# Patient Record
Sex: MALE | Race: BLACK OR AFRICAN AMERICAN
[De-identification: names, ages, dates, MRNs, and addresses within clinical notes are randomized per-mention and may not be internally consistent; named-entity substitution may affect disease eponyms.]

---

## 2017-09-24 NOTE — RAD
PA and lateral chest radiographs 9/24/2017.



Clinical History: Cough and sore throat for 4 days. 



PA and lateral digital radiographs of the chest were obtained. Comparison

study is dated 1/4/2016. The cardiac and mediastinal silhouettes are within

normal limits in size and configuration. No pulmonary infiltrate is seen. No

pleural effusion or pneumothorax is noted. Minimal S shaped curvature of the

thoracolumbar spine is seen.



Impression: No acute pulmonary infiltrate is seen.

## 2017-09-24 NOTE — PHYS DOC
Past Medical History


Past Medical History:  No Pertinent History, Hypertension, Schizophrenia


Additional Past Medical Histor:  "my heart rate is diffrent than everyone else"


Past Surgical History:  No Surgical History


Alcohol Use:  None


Drug Use:  None





Adult General


Chief Complaint


Chief Complaint:  SORE THROAT





HPI


HPI





Patient is a 26  year old male who presents with complaint of cough and sore 

throat. History was collected by patient's girlfriend as patient is currently 

refusing to speak due to his discomfort. It is reported the patient's had cough 

for the past 4 days. Patient awoke with sore throat this morning. Patient has 

been coughing up thick greenish phlegm and has been having streaks of blood in 

his mucus when he coughs. No reported fevers. Denies any significant health 

problems. Patient has not taken any medications to help with symptoms. No 

recent travel and no known sick contacts.





Review of Systems


Review of Systems





Constitutional: Denies fever or chills []


Eyes: Denies change in visual acuity, redness, or eye pain []


HENT: Sore throat[]


Respiratory: Cough[]


Cardiovascular: Denies chest pain[]


GI: Denies abdominal pain, nausea, vomiting, bloody stools or diarrhea []


: Denies dysuria or hematuria []


Musculoskeletal: Denies back pain or joint pain []


Integument: Denies rash or skin lesions []


Neurologic: Denies headache, focal weakness or sensory changes []





Current Medications


Current Medications





Current Medications








 Medications


  (Trade)  Dose


 Ordered  Sig/Ministerio  Start Time


 Stop Time Status Last Admin


Dose Admin


 


 Dexamethasone


 Sodium Phosphate


  (Decadron)  16 mg  1X  ONCE  17 10:45


 17 10:50 DC 17 10:52


16 MG











Allergies


Allergies





Allergies








Coded Allergies Type Severity Reaction Last Updated Verified


 


  No Known Drug Allergies    12/5/15 No











Physical Exam


Physical Exam





Constitutional: Alert, afebrile, refuses to speak, follows commands. []


HENT: Normocephalic, atraumatic, bilateral external ears normal, oropharynx 

erythematous, friable pharyngeal mucosa, no oral exudates, nose normal. []


Eyes: PERRLA, EOMI, conjunctiva normal, no discharge. [] 


Neck: Normal range of motion, anterior cervical lymphadenopathy present, supple

, no stridor. [] 


Cardiovascular:Heart rate regular rhythm, no murmur []


Lungs & Thorax:  Bilateral breath sounds clear to auscultation []


Abdomen: Bowel sounds normal, soft, no tenderness, no masses, no pulsatile 

masses. [] 


Skin: Warm, dry, no erythema, no rash. [] 


Back: No tenderness, no CVA tenderness. [] 


Extremities: No tenderness, no cyanosis, no clubbing, ROM intact, no edema. [] 


Neurologic: Alert, follows commands but refuses to speak, normal motor function

, normal sensory function, no focal deficits noted. []





Current Patient Data


Vital Signs





 Vital Signs








  Date Time  Temp Pulse Resp B/P (MAP) Pulse Ox O2 Delivery O2 Flow Rate FiO2


 


17 10:58 98.6 121 18  98 Room Air  





 98.6       








Lab Values





 Laboratory Tests








Test


  17


10:46


 


Group A Streptococcus Rapid


  Negative


(NEGATIVE)











EKG


EKG


Not performed[]





Radiology/Procedures


Radiology/Procedures


 [address] [address]


 [phone]


 


 IMAGING REPORT





 Signed





PATIENT: CHE COLINDRES ACCOUNT: [id number] MRN#: [id number]


: 1990 LOCATION: ER AGE: 26


SEX: M EXAM DT: 17 ACCESSION#: [id number]


STATUS: REG ER ORD. PHYSICIAN: BRITTNEY LAUGHLIN MD 


REASON: cough for 4 days


PROCEDURE: NECK SOFT TISSUE





AP and lateral soft tissue neck radiographs 2017





Clinical history: Cough and sore throat for 4 days.





AP and lateral digital radiographs of the neck were obtained using soft tissue


techniques. The epiglottis and aryepiglottic folds are within normal limits.


No prevertebral soft tissue swelling is seen. The osseous structures are


grossly intact.





Impression: Negative study.














DICTATED and SIGNED BY:     NICKOLAS WEBB MD


DATE:     17





CC: BRITTNEY LAUGHLIN MD; NO PCP ~


 [place] [id number] [phone]


 


 IMAGING REPORT





 Signed





PATIENT: CHE COLINDRES ACCOUNT: [id number] MRN#: [id number]


: 1990 LOCATION: ER AGE: 26


SEX: M EXAM DT: 17 ACCESSION#: [id number]


STATUS: REG ER ORD. PHYSICIAN: BRITTNEY LAUGHLIN MD 


REASON: cough for 4 days


PROCEDURE: CHEST PA & LATERAL








PA and lateral chest radiographs 2017.





Clinical History: Cough and sore throat for 4 days. 





PA and lateral digital radiographs of the chest were obtained. Comparison


study is dated 2016. The cardiac and mediastinal silhouettes are within


normal limits in size and configuration. No pulmonary infiltrate is seen. No


pleural effusion or pneumothorax is noted. Minimal S shaped curvature of the


thoracolumbar spine is seen.





Impression: No acute pulmonary infiltrate is seen.














DICTATED and SIGNED BY:     NICKOLAS WEBB MD


DATE:     17 1125





CC: BRITTNEY LAUGHLIN MD; NO PCP ~


[]





Course & Med Decision Making


Course & Med Decision Making


Pertinent Labs and Imaging studies reviewed. (See chart for details)





Rapid strep test was negative. X-rays negative. Patient's symptoms appear 

secondary to viral illness. Patient was treated with Decadron in the emergency 

department. Patient reports improvement in symptoms after treatment. Patient's 

symptoms likely consistent with viral upper respiratory infection. Patient will 

continue on Tylenol and Medrol Dosepak for outpatient treatment. Recommended 

follow-up with primary doctor in 2-3 days for reevaluation. Advised return 

emergency department for any worsening symptoms. Patient voiced understanding 

and in agreement with treatment plan.





Dragon Disclaimer


Dragon Disclaimer


This electronic medical record was generated, in whole or in part, using a 

voice recognition dictation system.





Departure


Departure


Impression:  


 Primary Impression:  


 Viral pharyngitis


 Additional Impression:  


 Upper respiratory infection


Disposition:  01 HOME, SELF-CARE


Condition:  IMPROVED


Referrals:  


NO PCP (PCP)


Patient Instructions:  Upper Respiratory Infection, Adult, Viral Pharyngitis





Additional Instructions:  


Follow-up with your primary doctor in 2-3 days for reevaluation. Return to 

emergency department for any worsening symptoms.


Scripts


Methylprednisolone (MEDROL) 4 Mg Tab.ds.pk


1 PKG PO UD, #1 PKG


   Prov: BRITTNEY LAUGHLIN MD         17





Problem Qualifiers








 Additional Impression:  


 Upper respiratory infection


 URI type:  unspecified URI  Qualified Codes:  J06.9 - Acute upper respiratory 

infection, unspecified








BRITTNEY LAUGHLIN MD Sep 24, 2017 10:48

## 2017-09-24 NOTE — RAD
AP and lateral soft tissue neck radiographs 9/24/2017



Clinical history: Cough and sore throat for 4 days.



AP and lateral digital radiographs of the neck were obtained using soft tissue

techniques. The epiglottis and aryepiglottic folds are within normal limits.

No prevertebral soft tissue swelling is seen. The osseous structures are

grossly intact.



Impression: Negative study.

## 2017-10-18 ENCOUNTER — HOSPITAL ENCOUNTER (EMERGENCY)
Dept: HOSPITAL 61 - ER | Age: 27
Discharge: HOME | End: 2017-10-18
Payer: COMMERCIAL

## 2017-10-18 VITALS — WEIGHT: 165 LBS | BODY MASS INDEX: 27.49 KG/M2 | HEIGHT: 65 IN

## 2017-10-18 VITALS — DIASTOLIC BLOOD PRESSURE: 83 MMHG | SYSTOLIC BLOOD PRESSURE: 129 MMHG

## 2017-10-18 DIAGNOSIS — K92.0: ICD-10-CM

## 2017-10-18 DIAGNOSIS — R10.84: Primary | ICD-10-CM

## 2017-10-18 DIAGNOSIS — F20.9: ICD-10-CM

## 2017-10-18 DIAGNOSIS — I10: ICD-10-CM

## 2017-10-18 LAB
ALBUMIN SERPL-MCNC: 3.8 G/DL (ref 3.4–5)
ALBUMIN/GLOB SERPL: 1 {RATIO} (ref 1–1.7)
ALP SERPL-CCNC: 69 U/L (ref 46–116)
ALT SERPL-CCNC: 41 U/L (ref 16–63)
ANION GAP SERPL CALC-SCNC: 7 MMOL/L (ref 6–14)
AST SERPL-CCNC: 24 U/L (ref 15–37)
BACTERIA #/AREA URNS HPF: 0 /HPF
BARBITURATES UR-MCNC: (no result) UG/ML
BASOPHILS # BLD AUTO: 0 X10^3/UL (ref 0–0.2)
BASOPHILS NFR BLD: 0 % (ref 0–3)
BENZODIAZ UR-MCNC: (no result) UG/L
BILIRUB SERPL-MCNC: 1 MG/DL (ref 0.2–1)
BILIRUB UR QL STRIP: NEGATIVE
BUN SERPL-MCNC: 14 MG/DL (ref 8–26)
BUN/CREAT SERPL: 11 (ref 6–20)
CALCIUM SERPL-MCNC: 8.8 MG/DL (ref 8.5–10.1)
CANNABINOIDS UR-MCNC: (no result) UG/L
CHLORIDE SERPL-SCNC: 103 MMOL/L (ref 98–107)
CO2 SERPL-SCNC: 32 MMOL/L (ref 21–32)
COCAINE UR-MCNC: (no result) NG/ML
CREAT SERPL-MCNC: 1.3 MG/DL (ref 0.7–1.3)
EOSINOPHIL NFR BLD: 1 % (ref 0–3)
ERYTHROCYTE [DISTWIDTH] IN BLOOD BY AUTOMATED COUNT: 13.3 % (ref 11.5–14.5)
GFR SERPLBLD BASED ON 1.73 SQ M-ARVRAT: 80.7 ML/MIN
GLOBULIN SER-MCNC: 4 G/DL (ref 2.2–3.8)
GLUCOSE SERPL-MCNC: 105 MG/DL (ref 70–99)
GLUCOSE UR STRIP-MCNC: NEGATIVE MG/DL
HCT VFR BLD CALC: 47.3 % (ref 39–53)
HGB BLD-MCNC: 16.4 G/DL (ref 13–17.5)
LYMPHOCYTES # BLD: 0.2 X10^3/UL (ref 1–4.8)
LYMPHOCYTES NFR BLD AUTO: 3 % (ref 24–48)
MCH RBC QN AUTO: 33 PG (ref 25–35)
MCHC RBC AUTO-ENTMCNC: 35 G/DL (ref 31–37)
MCV RBC AUTO: 94 FL (ref 79–100)
METHADONE SERPL-MCNC: (no result) NG/ML
MONOCYTES NFR BLD: 6 % (ref 0–9)
NEUTROPHILS NFR BLD AUTO: 91 % (ref 31–73)
NITRITE UR QL STRIP: NEGATIVE
OPIATES UR-MCNC: (no result) NG/ML
PCP SERPL-MCNC: (no result) MG/DL
PH UR STRIP: 8.5 [PH]
PLATELET # BLD AUTO: 249 X10^3/UL (ref 140–400)
PLATELET # BLD EST: ADEQUATE 10*3/UL
POTASSIUM SERPL-SCNC: 4.3 MMOL/L (ref 3.5–5.1)
PROT SERPL-MCNC: 7.8 G/DL (ref 6.4–8.2)
PROT UR STRIP-MCNC: NEGATIVE MG/DL
RBC # BLD AUTO: 5.05 X10^6/UL (ref 4.3–5.7)
RBC #/AREA URNS HPF: 0 /HPF (ref 0–2)
SODIUM SERPL-SCNC: 142 MMOL/L (ref 136–145)
SP GR UR STRIP: 1.02
SQUAMOUS #/AREA URNS LPF: (no result) /LPF
UROBILINOGEN UR-MCNC: 0.2 MG/DL
WBC # BLD AUTO: 8.4 X10^3/UL (ref 4–11)
WBC #/AREA URNS HPF: (no result) /HPF (ref 0–4)

## 2017-10-18 PROCEDURE — 96372 THER/PROPH/DIAG INJ SC/IM: CPT

## 2017-10-18 PROCEDURE — 36415 COLL VENOUS BLD VENIPUNCTURE: CPT

## 2017-10-18 PROCEDURE — 87591 N.GONORRHOEAE DNA AMP PROB: CPT

## 2017-10-18 PROCEDURE — 83690 ASSAY OF LIPASE: CPT

## 2017-10-18 PROCEDURE — 87491 CHLMYD TRACH DNA AMP PROBE: CPT

## 2017-10-18 PROCEDURE — 80053 COMPREHEN METABOLIC PANEL: CPT

## 2017-10-18 PROCEDURE — 74176 CT ABD & PELVIS W/O CONTRAST: CPT

## 2017-10-18 PROCEDURE — 85007 BL SMEAR W/DIFF WBC COUNT: CPT

## 2017-10-18 PROCEDURE — 80307 DRUG TEST PRSMV CHEM ANLYZR: CPT

## 2017-10-18 PROCEDURE — 81001 URINALYSIS AUTO W/SCOPE: CPT

## 2017-10-18 PROCEDURE — G0479 DRUG TEST PRESUMP NOT OPT: HCPCS

## 2017-10-18 PROCEDURE — 85025 COMPLETE CBC W/AUTO DIFF WBC: CPT

## 2017-10-18 PROCEDURE — 99285 EMERGENCY DEPT VISIT HI MDM: CPT

## 2017-10-18 NOTE — RAD
CT of the abdomen and pelvis without contrast 10/18/2017



Indication: Left flank pain



Comparison study: None



Technique: Multidetector CT imaging of the abdomen and pelvis was performed

without the administration of contrast.



Discussion: Visualized lung bases demonstrate no acute abnormality. Evidence

of prior granulomatous disease seen in the right lung base. Calcified

granulomata are noted in the liver and spleen. The liver and spleen otherwise

demonstrate a unremarkable noncontrast enhanced appearance. Gallbladder,

adrenal glands, pancreas have an unremarkable noncontrast enhanced appearance.

Bilateral kidneys are grossly unremarkable in appearance without evidence of

hydronephrosis or nephrolithiasis. The ureters are grossly unremarkable in

course and contour. No free fluid or free air is seen in the abdomen or

pelvis. Limited visualization of the bowel demonstrates no obstruction or

other gross abnormality. There is no evidence of appendicitis. No evidence of

acute osseous abnormality is identified.



Impression: No evidence of acute intra-abdominal abnormality is identified. No

evidence of nephrolithiasis or acute obstructive uropathy is seen.

## 2017-10-18 NOTE — PHYS DOC
Past Medical History


Past Medical History:  No Pertinent History, Hypertension, Schizophrenia


Additional Past Medical Histor:  "my heart rate is diffrent than everyone else"


Past Surgical History:  No Surgical History


Alcohol Use:  None


Drug Use:  None





Adult General


Chief Complaint


Chief Complaint:  ABDOMINAL PAIN





HPI


HPI





Patient is a 26  year old male with history of schizophrenia and hypertension 

who presents today with mild generalized abdominal pain and hematemesis that 

began this morning at 12:00 am. Patient denies any fever. Denies any diarrhea. 

Patient is also complaining of left flank pain. Patient denies any urgency 

frequency or dysuria though patient informed the nurse he has had penile 

discharge and is concerned about STDs. Denies any hematuria.





Review of Systems


Review of Systems





Constitutional: Denies fever or chills []


Eyes: Denies change in visual acuity, redness, or eye pain []


HENT: Denies nasal congestion or sore throat []


Respiratory: Denies cough or shortness of breath []


Cardiovascular: No additional information not addressed in HPI []


GI: generalized abdominal pain, nausea, vomiting blood, denies bloody stools or 

diarrhea []


: Denies dysuria or hematuria []


Musculoskeletal: Denies back pain or joint pain []


Integument: Denies rash or skin lesions []


Neurologic: Denies headache, focal weakness or sensory changes []





Current Medications


Current Medications





Current Medications








 Medications


  (Trade)  Dose


 Ordered  Sig/Ministerio  Start Time


 Stop Time Status Last Admin


Dose Admin


 


 Azithromycin


  (Zithromax)  1,000 mg  1X  ONCE  10/18/17 10:45


 10/18/17 10:46   


 


 


 Ceftriaxone Sodium


  (Rocephin Im)  250 mg  1X  ONCE  10/18/17 10:45


 10/18/17 10:46   


 


 


 Famotidine


  (Pepcid)  20 mg  1X  ONCE  10/18/17 09:00


 10/18/17 09:01 DC 10/18/17 09:03


20 MG


 


 Ketorolac


 Tromethamine


  (Toradol)  30 mg  1X  ONCE  10/18/17 08:15


 10/18/17 08:16 Cancel  


 


 


 Metronidazole


  (Flagyl)  2,000 mg  1X  ONCE  10/18/17 10:45


 10/18/17 10:46   


 


 


 Multi-Ingredient


 Mouthwash/Gargle


  (Gi Cocktail


 Single Dose)  15 ml  1X  ONCE  10/18/17 09:00


 10/18/17 09:01 DC 10/18/17 09:03


15 ML


 


 Ondansetron HCl


  (Zofran Odt)  4 mg  1X  ONCE  10/18/17 09:00


 10/18/17 09:01 DC 10/18/17 09:02


4 MG


 


 Ondansetron HCl


  (Zofran)  4 mg  1X  ONCE  10/18/17 08:15


 10/18/17 08:16 Cancel  


 


 


 Sodium Chloride  1,000 ml @ 


 1,000 mls/hr  1X  ONCE  10/18/17 08:15


 10/18/17 09:14 DC  


 











Allergies


Allergies





Allergies








Coded Allergies Type Severity Reaction Last Updated Verified


 


  No Known Drug Allergies    12/5/15 No











Physical Exam


Physical Exam





Constitutional: Well developed, well nourished, no acute distress, non-toxic 

appearance. []


HENT: Normocephalic, atraumatic, bilateral external ears normal, oropharynx 

moist, no oral exudates, nose normal. []


Eyes: PERRLA, EOMI, conjunctiva normal, no discharge. [] 


Neck: Normal range of motion, no tenderness, supple, no stridor. [] 


Cardiovascular:Heart rate regular rhythm, no murmur []


Lungs & Thorax:  Bilateral breath sounds clear to auscultation []


Abdomen: Rounded abdomen. Bowel sounds normal, soft, no tenderness, no masses, 

no pulsatile masses. [] 


Skin: Warm, dry, no erythema, no rash. [] 


Back: No tenderness, no CVA tenderness. [] 


Extremities: No tenderness, no cyanosis, no clubbing, ROM intact, no edema. [] 


Neurologic: Alert and oriented X 3, normal motor function, normal sensory 

function, no focal deficits noted. []


Psychologic: Affect normal, judgement normal, mood normal. []





Current Patient Data


Vital Signs





 Vital Signs








  Date Time  Temp Pulse Resp B/P (MAP) Pulse Ox O2 Delivery O2 Flow Rate FiO2


 


10/18/17 09:51  84 22 125/80 (95) 96 Room Air  


 


10/18/17 08:00 98.1       





 98.1       








Lab Values





 Laboratory Tests








Test


  10/18/17


08:10 10/18/17


09:40


 


White Blood Count


  8.4 x10^3/uL


(4.0-11.0) 


 


 


Red Blood Count


  5.05 x10^6/uL


(4.30-5.70) 


 


 


Hemoglobin


  16.4 g/dL


(13.0-17.5) 


 


 


Hematocrit


  47.3 %


(39.0-53.0) 


 


 


Mean Corpuscular Volume


  94 fL ()


  


 


 


Mean Corpuscular Hemoglobin 33 pg (25-35)   


 


Mean Corpuscular Hemoglobin


Concent 35 g/dL


(31-37) 


 


 


Red Cell Distribution Width


  13.3 %


(11.5-14.5) 


 


 


Platelet Count


  249 x10^3/uL


(140-400) 


 


 


Neutrophils (%) (Auto) 91 % (31-73)  H 


 


Lymphocytes (%) (Auto) 3 % (24-48)  L 


 


Monocytes (%) (Auto) 6 % (0-9)   


 


Eosinophils (%) (Auto) 1 % (0-3)   


 


Basophils (%) (Auto) 0 % (0-3)   


 


Neutrophils # (Auto)


  7.6 x10^3uL


(1.8-7.7) 


 


 


Lymphocytes # (Auto)


  0.2 x10^3/uL


(1.0-4.8)  L 


 


 


Monocytes # (Auto)


  0.5 x10^3/uL


(0.0-1.1) 


 


 


Eosinophils # (Auto)


  0.1 x10^3/uL


(0.0-0.7) 


 


 


Basophils # (Auto)


  0.0 x10^3/uL


(0.0-0.2) 


 


 


Platelet Estimate Pending   


 


Sodium Level


  142 mmol/L


(136-145) 


 


 


Potassium Level


  4.3 mmol/L


(3.5-5.1) 


 


 


Chloride Level


  103 mmol/L


() 


 


 


Carbon Dioxide Level


  32 mmol/L


(21-32) 


 


 


Anion Gap 7 (6-14)   


 


Blood Urea Nitrogen


  14 mg/dL


(8-26) 


 


 


Creatinine


  1.3 mg/dL


(0.7-1.3) 


 


 


Estimated GFR


(Cockcroft-Gault) 80.7  


  


 


 


BUN/Creatinine Ratio 11 (6-20)   


 


Glucose Level


  105 mg/dL


(70-99)  H 


 


 


Calcium Level


  8.8 mg/dL


(8.5-10.1) 


 


 


Total Bilirubin


  1.0 mg/dL


(0.2-1.0) 


 


 


Aspartate Amino Transferase


(AST) 24 U/L (15-37)


  


 


 


Alanine Aminotransferase (ALT)


  41 U/L (16-63)


  


 


 


Alkaline Phosphatase


  69 U/L


() 


 


 


Total Protein


  7.8 g/dL


(6.4-8.2) 


 


 


Albumin


  3.8 g/dL


(3.4-5.0) 


 


 


Albumin/Globulin Ratio 1.0 (1.0-1.7)   


 


Lipase


  121 U/L


() 


 


 


Ethyl Alcohol Level


  < 10 mg/dL


(0-10) 


 


 


Urine Collection Type  Unknown  


 


Urine Color  Yellow  


 


Urine Clarity  Clear  


 


Urine pH  8.5  


 


Urine Specific Gravity  1.020  


 


Urine Protein


  


  Negative mg/dL


(NEG-TRACE)


 


Urine Glucose (UA)


  


  Negative mg/dL


(NEG)


 


Urine Ketones (Stick)


  


  Negative mg/dL


(NEG)


 


Urine Blood


  


  Negative (NEG)


 


 


Urine Nitrite


  


  Negative (NEG)


 


 


Urine Bilirubin


  


  Negative (NEG)


 


 


Urine Urobilinogen Dipstick


  


  0.2 mg/dL (0.2


mg/dL)


 


Urine Leukocyte Esterase


  


  Negative (NEG)


 


 


Urine RBC  0 /HPF (0-2)  


 


Urine WBC


  


  1-4 /HPF (0-4)


 


 


Urine Squamous Epithelial


Cells 


  Occ /LPF  


 


 


Urine Bacteria


  


  0 /HPF (0-FEW)


 


 


Urine Mucus  Mod /LPF  


 


Urine Opiates Screen  Neg (NEG)  


 


Urine Methadone Screen  Neg (NEG)  


 


Urine Barbiturates  Neg (NEG)  


 


Urine Phencyclidine Screen  Neg (NEG)  


 


Urine


Amphetamine/Methamphetamine 


  Neg (NEG)  


 


 


Urine Benzodiazepines Screen  Neg (NEG)  


 


Urine Cocaine Screen  Neg (NEG)  


 


Urine Cannabinoids Screen  Neg (NEG)  


 


Urine Ethyl Alcohol  Neg (NEG)  





 Laboratory Tests


10/18/17 08:10








 Laboratory Tests


10/18/17 08:10














EKG


EKG


[]





Radiology/Procedures


Radiology/Procedures


[]





Course & Med Decision Making


Course & Med Decision Making


Pertinent Labs and Imaging studies reviewed. (See chart for details)





This is a 26-year-old male patient presented to the ED today with generalized 

abdominal pain hematemesis and flank pain that began this morning. Patient is 

also concerned about STDs would like to be tested and treated.


Patient refused IV. CBC CMP lipase with no acute findings, urine analysis is 

negative for any acute findings. 


Patient was treated in the ED for STDs and given Rocephin Flagyl and 

azithromycin. Follow-up with GI doctor in one week. Educated on STDs and they 

need to use protection at all times. Discharged with Protonix. Patient is 

eating in the ED with no distress. Discharged with Compazine.





Dragon Disclaimer


Dragon Disclaimer


This electronic medical record was generated, in whole or in part, using a 

voice recognition dictation system.





Departure


Departure


Impression:  


 Primary Impression:  


 Concern about STD in male without diagnosis


 Additional Impressions:  


 Hematemesis


 Abdominal pain


Disposition:  01 HOME, SELF-CARE


Condition:  STABLE


Referrals:  


NO PCP (PCP)








JANAY HEREDIA MD


follow up in one week


Patient Instructions:  Abdominal Pain, Nausea and Vomiting, Sexually 

Transmitted Disease





Additional Instructions:  


You were seen for abdominal pain and vomiting blood. Consider taking an 

antiacid every day eg protonix daily. Your were treated for STDs in the ED. 

Ensure you use protection at all times. Contact your partners and let them know 

you were treated for STDS and ask them to seek treatment too. Follow-up with 

the provided gastroenterologist in the next 1 week. Come back to the ED if 

symptoms worsen.


Scripts


Prochlorperazine Maleate (Compazine) 10 Mg Tablet


10 MG PO Q8HRS, #30 TAB


   Prov: JAQUELINE JIMÉENZ         10/18/17 


Omeprazole (OMEPRAZOLE) 20 Mg Capsule.


1 CAP PO DAILY, #30 CAP 5 Refills


   Prov: JAQUELINE JIMÉNEZ         10/18/17





Problem Qualifiers








 Additional Impressions:  


 Hematemesis


 Nausea presence:  with nausea  Qualified Codes:  K92.0 - Hematemesis


 Abdominal pain


 Abdominal location:  generalized  Qualified Codes:  R10.84 - Generalized 

abdominal pain








JAQUELINE JIMÉNEZ Oct 18, 2017 08:47

## 2018-02-07 ENCOUNTER — HOSPITAL ENCOUNTER (EMERGENCY)
Dept: HOSPITAL 61 - ER | Age: 28
Discharge: HOME | End: 2018-02-07
Payer: COMMERCIAL

## 2018-02-07 VITALS — SYSTOLIC BLOOD PRESSURE: 138 MMHG | DIASTOLIC BLOOD PRESSURE: 81 MMHG

## 2018-02-07 DIAGNOSIS — I10: ICD-10-CM

## 2018-02-07 DIAGNOSIS — B34.9: Primary | ICD-10-CM

## 2018-02-07 PROCEDURE — 99283 EMERGENCY DEPT VISIT LOW MDM: CPT

## 2018-02-07 RX ADMIN — ACETAMINOPHEN 1 MG: 500 TABLET ORAL at 16:29

## 2018-10-27 ENCOUNTER — HOSPITAL ENCOUNTER (EMERGENCY)
Dept: HOSPITAL 61 - ER | Age: 28
Discharge: LEFT BEFORE DISCHARGE | End: 2018-10-27
Payer: COMMERCIAL

## 2018-10-27 DIAGNOSIS — T16.1XXA: Primary | ICD-10-CM
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
## 2018-10-27 DIAGNOSIS — Z53.21: ICD-10-CM

## 2018-12-08 ENCOUNTER — HOSPITAL ENCOUNTER (EMERGENCY)
Dept: HOSPITAL 63 - ER | Age: 28
Discharge: HOME | End: 2018-12-08
Payer: COMMERCIAL

## 2018-12-08 VITALS — BODY MASS INDEX: 31.32 KG/M2 | HEIGHT: 65 IN | WEIGHT: 188 LBS

## 2018-12-08 VITALS — SYSTOLIC BLOOD PRESSURE: 161 MMHG | DIASTOLIC BLOOD PRESSURE: 94 MMHG

## 2018-12-08 DIAGNOSIS — R14.0: ICD-10-CM

## 2018-12-08 DIAGNOSIS — M79.10: ICD-10-CM

## 2018-12-08 DIAGNOSIS — R11.2: Primary | ICD-10-CM

## 2018-12-08 LAB
% ATYL: 1 % (ref 0–0)
% LYMPHS: 3 % (ref 24–48)
% MONOS: 5 % (ref 0–10)
% SEGS: 91 % (ref 35–66)
ALBUMIN SERPL-MCNC: 4.5 G/DL (ref 3.4–5)
ALBUMIN/GLOB SERPL: 1 {RATIO} (ref 1–1.7)
ALP SERPL-CCNC: 83 U/L (ref 46–116)
ALT SERPL-CCNC: 48 U/L (ref 16–63)
AMPHETAMINE/METHAMPHETAMINE: (no result)
ANION GAP SERPL CALC-SCNC: 11 MMOL/L (ref 6–14)
APTT PPP: YELLOW S
AST SERPL-CCNC: 20 U/L (ref 15–37)
BACTERIA #/AREA URNS HPF: (no result) /HPF
BARBITURATES UR-MCNC: (no result) UG/ML
BASOPHILS # BLD AUTO: 0 X10^3/UL (ref 0–0.2)
BASOPHILS NFR BLD: 1 % (ref 0–3)
BENZODIAZ UR-MCNC: (no result) UG/L
BILIRUB SERPL-MCNC: 1.6 MG/DL (ref 0.2–1)
BILIRUB UR QL STRIP: (no result)
BUN/CREAT SERPL: 7 (ref 6–20)
CA-I SERPL ISE-MCNC: 8 MG/DL (ref 8–26)
CALCIUM SERPL-MCNC: 9.4 MG/DL (ref 8.5–10.1)
CANNABINOIDS UR-MCNC: (no result) UG/L
CHLORIDE SERPL-SCNC: 99 MMOL/L (ref 98–107)
CO2 SERPL-SCNC: 29 MMOL/L (ref 21–32)
COCAINE UR-MCNC: (no result) NG/ML
CREAT SERPL-MCNC: 1.2 MG/DL (ref 0.7–1.3)
EOSINOPHIL NFR BLD: 0 % (ref 0–3)
EOSINOPHIL NFR BLD: 0 X10^3/UL (ref 0–0.7)
ERYTHROCYTE [DISTWIDTH] IN BLOOD BY AUTOMATED COUNT: 13.4 % (ref 11.5–14.5)
FIBRINOGEN PPP-MCNC: CLEAR MG/DL
GFR SERPLBLD BASED ON 1.73 SQ M-ARVRAT: 87.2 ML/MIN
GLOBULIN SER-MCNC: 4.5 G/DL (ref 2.2–3.8)
GLUCOSE SERPL-MCNC: 120 MG/DL (ref 70–99)
GLUCOSE UR STRIP-MCNC: (no result) MG/DL
HCT VFR BLD CALC: 52.3 % (ref 39–53)
HGB BLD-MCNC: 18 G/DL (ref 13–17.5)
INFLUENZA A PATIENT: NEGATIVE
INFLUENZA B PATIENT: NEGATIVE
LIPASE: 71 U/L (ref 73–393)
LYMPHOCYTES # BLD: 0.6 X10^3/UL (ref 1–4.8)
LYMPHOCYTES NFR BLD AUTO: 7 % (ref 24–48)
MCH RBC QN AUTO: 32 PG (ref 25–35)
MCHC RBC AUTO-ENTMCNC: 34 G/DL (ref 31–37)
MCV RBC AUTO: 94 FL (ref 79–100)
METHADONE SERPL-MCNC: (no result) NG/ML
MONO #: 0.1 X10^3/UL (ref 0–1.1)
MONOCYTES NFR BLD: 1 % (ref 0–9)
NEUT #: 8.2 X10^3UL (ref 1.8–7.7)
NEUTROPHILS NFR BLD AUTO: 92 % (ref 31–73)
NITRITE UR QL STRIP: (no result)
OPIATES UR-MCNC: (no result) NG/ML
PCP SERPL-MCNC: (no result) MG/DL
PLATELET # BLD AUTO: 348 X10^3/UL (ref 140–400)
PLATELET # BLD EST: ADEQUATE 10*3/UL
POTASSIUM SERPL-SCNC: 3.9 MMOL/L (ref 3.5–5.1)
PROT SERPL-MCNC: 9 G/DL (ref 6.4–8.2)
RBC # BLD AUTO: 5.6 X10^6/UL (ref 4.3–5.7)
RBC #/AREA URNS HPF: (no result) /HPF (ref 0–2)
SODIUM SERPL-SCNC: 139 MMOL/L (ref 136–145)
SP GR UR STRIP: 1.02
SQUAMOUS #/AREA URNS LPF: (no result) /LPF
UROBILINOGEN UR-MCNC: 0.2 MG/DL
WBC # BLD AUTO: 8.9 X10^3/UL (ref 4–11)
WBC #/AREA URNS HPF: (no result) /HPF (ref 0–4)

## 2018-12-08 PROCEDURE — 85007 BL SMEAR W/DIFF WBC COUNT: CPT

## 2018-12-08 PROCEDURE — 81001 URINALYSIS AUTO W/SCOPE: CPT

## 2018-12-08 PROCEDURE — 87804 INFLUENZA ASSAY W/OPTIC: CPT

## 2018-12-08 PROCEDURE — 96374 THER/PROPH/DIAG INJ IV PUSH: CPT

## 2018-12-08 PROCEDURE — 80053 COMPREHEN METABOLIC PANEL: CPT

## 2018-12-08 PROCEDURE — 85025 COMPLETE CBC W/AUTO DIFF WBC: CPT

## 2018-12-08 PROCEDURE — 99284 EMERGENCY DEPT VISIT MOD MDM: CPT

## 2018-12-08 PROCEDURE — 36415 COLL VENOUS BLD VENIPUNCTURE: CPT

## 2018-12-08 PROCEDURE — 96372 THER/PROPH/DIAG INJ SC/IM: CPT

## 2018-12-08 PROCEDURE — 74177 CT ABD & PELVIS W/CONTRAST: CPT

## 2018-12-08 PROCEDURE — 80307 DRUG TEST PRSMV CHEM ANLYZR: CPT

## 2018-12-08 PROCEDURE — 83690 ASSAY OF LIPASE: CPT

## 2018-12-08 NOTE — RAD
INDICATION: Abomen pain, nausea, vomiting, fever, chills

 

COMPARISON: None.

 

TECHNIQUE:

 

Axial CT images obtained through the abdomen and pelvis with contrast.

 

One or more of the following individualized dose reduction techniques were

utilized for this examination:  1. Automated exposure control;  2. 

Adjustment of the mA and/or kV according to patient size;  3. Use of 

iterative reconstruction technique.

 

FINDINGS:

 

Tiny hiatal hernia versus mild distention distal esophagus.

Abdominal aorta not aneurysmal.

Liver is low-attenuation. Nonspecific but can be seen with fatty 

infiltration. Calcified granulomas of liver and spleen.

Gallbladder is partially contracted.

No peripancreatic fluid collection.

No left-sided hydronephrosis.

Urinary bladder has minimal urine within it at time of exam.

Tiny fat-containing umbilical hernia.

No right-sided hydronephrosis.

Appendix measures up to about 6 mm without definite adjacent inflammatory 

changes.

No dilated loops of bowel to suggest obstruction.

 

 

IMPRESSION:

 

1.  No evidence of bowel obstruction.

 

2.  The appendix is near the upper limits of normal in size with no 

adjacent inflammatory changes to suggest appendicitis.

 

Electronically signed by: Van Oconnor MD (12/8/2018 3:48 AM) [id number]

## 2018-12-08 NOTE — PHYS DOC
Adult General


Chief Complaint


Chief Complaint


abd pain





HPI


HPI


28 male presented to the emergency department with 2 weeks history of abdominal 

pain described as cramps associated with feeling of bloating nausea and minimal 

vomiting. Also had to episode of diarrhea after taking a laxative also 

complaining of body aches and mild fever at home





Review of Systems


Review of Systems





Constitutional: Denies  chills []


Eyes: Denies change in visual acuity, redness, or eye pain []


HENT: Denies nasal congestion or sore throat []


Respiratory: Denies cough or shortness of breath []


Cardiovascular: No additional information not addressed in HPI []


: Denies dysuria or hematuria []


Musculoskeletal: Denies back pain or joint pain []


Integument: Denies rash or skin lesions []


Neurologic: Denies headache, focal weakness or sensory changes []


Endocrine: Denies polyuria or polydipsia []





All other systems were reviewed and found to be within normal limits, except as 

documented in this note.





Current Medications


Current Medications





Current Medications








 Medications


  (Trade)  Dose


 Ordered  Sig/Ministerio  Start Time


 Stop Time Status Last Admin


Dose Admin


 


 Dicyclomine HCl


  (Bentyl)  20 mg  1X  ONCE  12/8/18 03:00


 12/8/18 03:01 DC 12/8/18 02:42


20 MG


 


 Info


  (Do NOT chart on


 this entry -- for


 MONITORING)  1 each  PRN DAILY  PRN  12/8/18 03:00


 12/10/18 02:59   





 


 Iohexol


  (Omnipaque 300


 Mg/ml)  75 ml  1X  ONCE  12/8/18 03:00


 12/8/18 03:01 DC 12/8/18 02:56


75 ML


 


 Ketorolac


 Tromethamine


  (Toradol 30mg


 Vial)  30 mg  1X  ONCE  12/8/18 03:00


 12/8/18 03:01 DC 12/8/18 02:42


30 MG











Allergies


Allergies





Allergies








Coded Allergies Type Severity Reaction Last Updated Verified


 


  No Known Drug Allergies    11/8/15 No











Physical Exam


Physical Exam





Constitutional: Well developed, well nourished, no acute distress, non-toxic 

appearance. []


HENT: Normocephalic, atraumatic, bilateral external ears normal, oropharynx 

moist, no oral exudates, nose normal. []


Eyes: PERRLA, EOMI, conjunctiva normal, no discharge. [] 


Neck: Normal range of motion, no tenderness, supple, no stridor. [] 


Cardiovascular:Heart rate regular rhythm, no murmur []


Lungs & Thorax:  Bilateral breath sounds clear to auscultation []


Abdomen: Bowel sounds normal, soft, tenderness all over , no masses, no 

pulsatile masses. [] 


Skin: Warm, dry, no erythema, no rash. [] 


Back: No tenderness, no CVA tenderness. [] 


Extremities: No tenderness, no cyanosis, no clubbing, ROM intact, no edema. [] 


Neurologic: Alert and oriented X 3, normal motor function, normal sensory 

function, no focal deficits noted. []


Psychologic: Affect normal, judgement normal, mood normal. []





Current Patient Data


Vital Signs





 Vital Signs








  Date Time  Temp Pulse Resp B/P (MAP) Pulse Ox O2 Delivery O2 Flow Rate FiO2


 


12/8/18 01:52 98.3 74 20  99 Room Air  








Lab Results





 Laboratory Tests








Test


 12/8/18


02:55


 


White Blood Count


 8.9 x10^3/uL


(4.0-11.0)


 


Red Blood Count


 5.60 x10^6/uL


(4.30-5.70)


 


Hemoglobin


 18.0 g/dL


(13.0-17.5)  H


 


Hematocrit


 52.3 %


(39.0-53.0)


 


Mean Corpuscular Volume


 94 fL ()





 


Mean Corpuscular Hemoglobin 32 pg (25-35)  


 


Mean Corpuscular Hemoglobin


Concent 34 g/dL


(31-37)


 


Red Cell Distribution Width


 13.4 %


(11.5-14.5)


 


Platelet Count


 348 x10^3/uL


(140-400)


 


Neutrophils (%) (Auto) 92 % (31-73)  H


 


Lymphocytes (%) (Auto) 7 % (24-48)  L


 


Monocytes (%) (Auto) 1 % (0-9)  


 


Eosinophils (%) (Auto) 0 % (0-3)  


 


Basophils (%) (Auto) 1 % (0-3)  


 


Neutrophils # (Auto)


 8.2 x10^3uL


(1.8-7.7)  H


 


Lymphocytes # (Auto)


 0.6 x10^3/uL


(1.0-4.8)  L


 


Monocytes # (Auto)


 0.1 x10^3/uL


(0.0-1.1)


 


Eosinophils # (Auto)


 0.0 x10^3/uL


(0.0-0.7)


 


Basophils # (Auto)


 0.0 x10^3/uL


(0.0-0.2)


 


Platelet Estimate Pending  


 


Urine Collection Type Unknown  


 


Urine Color Yellow  


 


Urine Clarity Clear  


 


Urine pH 8.5  


 


Urine Specific Gravity 1.020  


 


Urine Protein


 Neg


(NEG-TRACE)


 


Urine Glucose (UA)


 Neg mg/dL


(NEG)


 


Urine Ketones (Stick)


 40 mg/dL (NEG)





 


Urine Blood Trace (NEG)  


 


Urine Nitrite Neg (NEG)  


 


Urine Bilirubin Neg (NEG)  


 


Urine Urobilinogen Dipstick


 0.2 mg/dL (0.2


mg/dL)


 


Urine Leukocyte Esterase Neg (NEG)  


 


Urine RBC


 3-5 /HPF (0-2)





 


Urine WBC


 Occ /HPF (0-4)





 


Urine Squamous Epithelial


Cells Few /LPF  





 


Urine Transitional Epithelial


Cells Few /LPF  





 


Urine Renal Epithelial Cells Few /LPF  


 


Urine Bacteria


 Few /HPF


(0-FEW)


 


Urine Mucus Slight /LPF  


 


Sodium Level


 139 mmol/L


(136-145)


 


Potassium Level


 3.9 mmol/L


(3.5-5.1)


 


Chloride Level


 99 mmol/L


()


 


Carbon Dioxide Level


 29 mmol/L


(21-32)


 


Anion Gap 11 (6-14)  


 


Blood Urea Nitrogen


 8 mg/dL (8-26)





 


Creatinine


 1.2 mg/dL


(0.7-1.3)


 


Estimated GFR


(Cockcroft-Gault) 87.2  





 


BUN/Creatinine Ratio 7 (6-20)  


 


Glucose Level


 120 mg/dL


(70-99)  H


 


Calcium Level


 9.4 mg/dL


(8.5-10.1)


 


Total Bilirubin


 1.6 mg/dL


(0.2-1.0)  H


 


Aspartate Amino Transferase


(AST) 20 U/L (15-37)





 


Alanine Aminotransferase (ALT)


 48 U/L (16-63)





 


Alkaline Phosphatase


 83 U/L


()


 


Total Protein


 9.0 g/dL


(6.4-8.2)  H


 


Albumin


 4.5 g/dL


(3.4-5.0)


 


Albumin/Globulin Ratio 1.0 (1.0-1.7)  


 


Lipase


 71 U/L


()  L


 


Urine Opiates Screen Neg (NEG)  


 


Urine Methadone Screen Neg (NEG)  


 


Urine Barbiturates Neg (NEG)  


 


Urine Phencyclidine Screen Neg (NEG)  


 


Urine


Amphetamine/Methamphetamine Neg (NEG)  





 


Urine Benzodiazepines Screen Neg (NEG)  


 


Urine Cocaine Screen Neg (NEG)  


 


Urine Cannabinoids Screen Neg (NEG)  


 


Urine Ethyl Alcohol Neg (NEG)  


 


Influenza Type A (Rapid)


 Negative


(NEGATIVE)


 


Influenza Type B (Rapid)


 Negative


(NEGATIVE)











EKG


EKG


[]





Radiology/Procedures


Radiology/Procedures


[]





Course & Med Decision Making


Course & Med Decision Making


Pertinent Labs and Imaging studies reviewed. (See chart for details)





[]





Final Impression


Final Impression


[]


Problems:  


(1) Vomiting


Qualifiers:  


   Qualified Codes:  R11.2 - Nausea with vomiting, unspecified





Dragon Disclaimer


Dragon Disclaimer


This electronic medical record was generated, in whole or in part, using a 

voice recognition dictation system.











STEFANIE RIVERA MD Dec 8, 2018 02:31

## 2018-12-09 ENCOUNTER — HOSPITAL ENCOUNTER (EMERGENCY)
Dept: HOSPITAL 63 - ER | Age: 28
Discharge: HOME | End: 2018-12-09
Payer: SELF-PAY

## 2018-12-09 VITALS — SYSTOLIC BLOOD PRESSURE: 145 MMHG | DIASTOLIC BLOOD PRESSURE: 97 MMHG

## 2018-12-09 VITALS — BODY MASS INDEX: 31.32 KG/M2 | WEIGHT: 188 LBS | HEIGHT: 65 IN

## 2018-12-09 DIAGNOSIS — M25.512: Primary | ICD-10-CM

## 2018-12-09 DIAGNOSIS — I10: ICD-10-CM

## 2018-12-09 DIAGNOSIS — F41.9: ICD-10-CM

## 2018-12-09 DIAGNOSIS — K21.9: ICD-10-CM

## 2018-12-09 PROCEDURE — 99283 EMERGENCY DEPT VISIT LOW MDM: CPT

## 2018-12-09 PROCEDURE — 73030 X-RAY EXAM OF SHOULDER: CPT

## 2018-12-09 PROCEDURE — 71046 X-RAY EXAM CHEST 2 VIEWS: CPT

## 2018-12-09 NOTE — RAD
Three-view left shoulder dated 12/9/2018.

 

No comparison available.

 

Clinical data indication: Pain after injury.

 

FINDINGS:

 

3 views left shoulder show normal bony alignment. No displaced fracture. 

No acute osseous or articular abnormality.

 

IMPRESSION:

 

No acute findings.

 

Electronically signed by: Jones Andres MD (12/9/2018 7:57 PM) 

[facility]

## 2018-12-09 NOTE — RAD
Two-view chest dated 12/9/2018.

 

Comparison made to 8/10/2016.

 

CLINICAL INDICATION: Chest pain and left shoulder pain.

 

FINDINGS:

 

PA and lateral views obtained. Heart and mediastinal contours are stable. 

Lungs are clear without focal consolidation. Vascular interstitium within 

normal limits. No pleural effusion or pneumothorax.

 

IMPRESSION:

 

No acute radiographic abnormality.

 

Electronically signed by: Jones Andres MD (12/9/2018 7:57 PM) 

[facility]

## 2018-12-09 NOTE — ED.ADGEN
Past History


Past Medical History:  Anxiety, GERD, Hypertension, Other


Past Surgical History:  No Surgical History


Alcohol Use:  None


Drug Use:  None





Adult General


Chief Complaint


Chief Complaint


"My shoulder hurts here on lt... .I got in injury some how.. it makes the 

muscle in my neck, back, and chest tight and spasms.. when I move it .. it 

sometimes pops.. I need some strong pain meds.. Hydros or something...





HPI


HPI





Patient is a 28 year old male who presents with above hx and complaints of left 

shoulder pain from unknown injury. Patient requesting narcotic medications for 

the pain. Patient is right-hand dominant. No history of specific trauma. No 

history of ill contacts, travel or immunosuppression. Patient localizes pain in 

movement of left shoulder. Does have deltoid sensation. Distal neurovascular 

intact. Patient does complain of muscle spasm in left trapezius shoulder and 

deltoid.  Patient states his left shoulder pain radiated all the way down into 

his hand on the left.  Pt.  distal sensation and capillary refill equal to Rt.  

Patient states pain is 10 out of 10.  Noted pt sleeping when I entered the room 

and had to be awaken for exam.  Nothing makes pain better except hydrocodone 

meds.





Review of Systems


Review of Systems





Constitutional: Denies fever or chills []


Eyes: Denies change in visual acuity, redness, or eye pain []


HENT: Denies nasal congestion or sore throat []


Respiratory: Denies cough or shortness of breath []


Cardiovascular: No additional information not addressed in HPI []


GI: Denies abdominal pain, nausea, vomiting, bloody stools or diarrhea []


: Denies dysuria or hematuria []


Musculoskeletal: Denies back pain or joint pain []complains of left shoulder 

muscle pain


Integument: Denies rash or skin lesions []


Neurologic: Denies headache, focal weakness or sensory changes []


Endocrine: Denies polyuria or polydipsia []





All other systems were reviewed and found to be within normal limits, except as 

documented in this note.





Family History


Family History


Noncontributory





Current Medications


Current Medications





Current Medications








 Medications


  (Trade)  Dose


 Ordered  Sig/Ministerio  Start Time


 Stop Time Status Last Admin


Dose Admin


 


 Ketorolac


 Tromethamine


  (Toradol Im)  60 mg  1X  ONCE  12/9/18 20:00


 12/9/18 20:01 DC  














Allergies


Allergies





Allergies








Coded Allergies Type Severity Reaction Last Updated Verified


 


  No Known Drug Allergies    11/8/15 No











Physical Exam


Physical Exam





Constitutional: Well developed, well nourished, no acute distress, non-toxic 

appearance. []


HENT: Normocephalic, atraumatic, bilateral external ears normal, oropharynx 

moist, no oral exudates, nose normal. []


Eyes: PERRLA, EOMI, conjunctiva normal, no discharge. [] 


Neck: Normal range of motion, no tenderness, supple, no stridor. [] 


Cardiovascular:Heart rate regular rhythm, no murmur []


Lungs & Thorax:  Bilateral breath sounds clear to auscultation []


Abdomen: Bowel sounds normal, soft, no tenderness, no masses, no pulsatile 

masses. [] 


Skin: Warm, dry, no erythema, no rash. [] Multiple tattoos.


Back: No tenderness, no CVA tenderness. [] 


Extremities: No tenderness, no cyanosis, no clubbing, ROM intact, no edema. [] 


Neurologic: Alert and oriented X 3, normal motor function, normal sensory 

function, no focal deficits noted. []DTRs +2 patella and brachial. No drift.


Psychologic: Affect normal, judgement normal, mood normal. []





Current Patient Data


Vital Signs





 Vital Signs








  Date Time  Temp Pulse Resp B/P (MAP) Pulse Ox O2 Delivery O2 Flow Rate FiO2


 


12/9/18 19:00 98.9 93 18  93 Room Air  











EKG


EKG


[]





Radiology/Procedures


Radiology/Procedures


I interpretation of chest x-ray and shoulder film shows no obvious fracture 

dislocation or pneumothorax. No acute cardiopulmonary findings.[]





Course & Med Decision Making


Course & Med Decision Making


Pertinent Labs and Imaging studies reviewed. (See chart for details)





Patient does seem to exhibit narcotic seeking behavior.  Patient use ice packs 

left shoulder as needed for the next 3 days. May advance to moist warm 

compresses after 3 days. Patient follow-up primary care. Patient take Tylenol 

and ibuprofen for pain. Patient use sling for rest.  Patient refused Toradol 

injection in spite of his complaints of 10 out of 10 pain.  If no improvement 

return for re-exam and further work up. 





[]





Final Impression


Final Impression


1. Shoulder[]Lt Arthralgia- complaints





Dragon Disclaimer


Dragon Disclaimer


This electronic medical record was generated, in whole or in part, using a 

voice recognition dictation system.











SHEREEN JADE MD Dec 9, 2018 19:02